# Patient Record
Sex: MALE | Race: WHITE | NOT HISPANIC OR LATINO
[De-identification: names, ages, dates, MRNs, and addresses within clinical notes are randomized per-mention and may not be internally consistent; named-entity substitution may affect disease eponyms.]

---

## 2022-04-26 PROBLEM — Z00.00 ENCOUNTER FOR PREVENTIVE HEALTH EXAMINATION: Status: ACTIVE | Noted: 2022-04-26

## 2022-04-27 ENCOUNTER — APPOINTMENT (OUTPATIENT)
Dept: ORTHOPEDIC SURGERY | Facility: CLINIC | Age: 72
End: 2022-04-27
Payer: MEDICARE

## 2022-04-27 VITALS — WEIGHT: 174 LBS | BODY MASS INDEX: 24.91 KG/M2 | HEIGHT: 70 IN

## 2022-04-27 DIAGNOSIS — E78.00 PURE HYPERCHOLESTEROLEMIA, UNSPECIFIED: ICD-10-CM

## 2022-04-27 PROCEDURE — 73030 X-RAY EXAM OF SHOULDER: CPT | Mod: RT

## 2022-04-27 PROCEDURE — 20611 DRAIN/INJ JOINT/BURSA W/US: CPT | Mod: RT

## 2022-04-27 PROCEDURE — J3490M: CUSTOM

## 2022-04-27 PROCEDURE — 99204 OFFICE O/P NEW MOD 45 MIN: CPT | Mod: 25

## 2022-04-27 RX ORDER — PRAVASTATIN SODIUM 80 MG/1
TABLET ORAL
Refills: 0 | Status: ACTIVE | COMMUNITY

## 2022-04-29 NOTE — HISTORY OF PRESENT ILLNESS
[de-identified] : Here for right shoulder discomfort that has been going on for about 6 months or so now. Had treated about 15 years ago with O/C for the same issue. recently started to have pain with lifting overhead, having some pain with with pushups, bench press or mostly anything over 20lbs. Pain has gotten better recently

## 2022-04-29 NOTE — PHYSICAL EXAM
[NL (0-180)] : full active abduction 0-180 degrees [NL (0-70)] : full internal rotation 0-70 degrees [NL (0-90)] : full external rotation 0-90 degrees [4 ___] : forward flexion 4[unfilled]/5 [4___] : internal rotation 4[unfilled]/5 [] : motor and sensory intact distally [Right] : right shoulder [Degenerative change] : Degenerative change

## 2022-04-29 NOTE — DISCUSSION/SUMMARY
[Surgical risks reviewed] : Surgical risks reviewed [Medication Risks Reviewed] : Medication risks reviewed [de-identified] : signs and symptoms of rotator cuff tear, discussed risks of potential rotator cuff  surgery and risks of operative  and non operative treatment of of tendonitis and impingement, will obtain mri to see if surgery is necessary and guide future treatment, in interim discussed use of nsaids and side effects and recommended rehab and discussed risks and benefits of injection\par will inject and obtain mri to rule out tear\par recommend mri to rule out surgical pathology and further guide treatment, follow up immediately after mri\par The risks and benefits of surgery have been discussed. Risks include but are not limited to bleeding, infection, reaction to anesthesia, injury to blood vessels and nerves, malunion, nonunion, DVT, PE, necessity of repeat surgery, chronic pain, loss of limb and death. The patient understands the risks and has chosen to proceed with conservative care. All questions have been answered.\par Large joint injection was performed of the right shoulder. An injection of Bupivacaine (Marcaine) cc of 0.5%  was used Betamethasone (Celestone) cc of 6mg. \par Patient was advised to call if redness, pain or fever occur and apply ice for 15 minutes out of every hour for the next 12-24 hours as tolerated. \par \par Patient has tried OTC's including aspirin, Ibuprofen, Aleve, etc or prescription NSAIDS, and/or exercises at home and/or physical therapy without satisfactory response and the risks benefits, and alternatives have been discussed, and verbal consent was obtained. \par Ultrasound guidance was indicated for this patient due to precise injection in area of tear. All ultrasound images have been permanently captured and stored accordingly in our picture archiving and communication system. Visualization of the needle and placement of injection was performed without complication. \par The findings showed no evidence of ligament, tendon or muscle tear and no effusion or other fluid collection.\par

## 2022-05-18 ENCOUNTER — APPOINTMENT (OUTPATIENT)
Dept: ORTHOPEDIC SURGERY | Facility: CLINIC | Age: 72
End: 2022-05-18
Payer: MEDICARE

## 2022-05-18 VITALS — HEIGHT: 70 IN | WEIGHT: 174 LBS | BODY MASS INDEX: 24.91 KG/M2

## 2022-05-18 PROCEDURE — 99214 OFFICE O/P EST MOD 30 MIN: CPT

## 2022-05-23 NOTE — PHYSICAL EXAM
[Right] : right shoulder [NL (0-180)] : full active abduction 0-180 degrees [NL (0-70)] : full internal rotation 0-70 degrees [NL (0-90)] : full external rotation 0-90 degrees [] : negative Kunal [FreeTextEntry8] : upper trap spasm

## 2022-05-23 NOTE — DISCUSSION/SUMMARY
[de-identified] : injeciton helped a lot receommend therapy for motion and stengteing and will re eval in amonth\par Discussed risks of potential surgery. However, due to the risks of the surgery, we will try NSAIDs and therapy. Discussed management of medication.\par The risks and benefits of surgery have been discussed. Risks include but are not limited to bleeding, infection, reaction to anesthesia, injury to blood vessels and nerves, malunion, nonunion, DVT, PE, necessity of repeat surgery, chronic pain, loss of limb and death. The patient understands the risks and has chosen to proceed with conservative care. All questions have been answered.\par prescribed nsaids and discussed risks of side effects and timing and management of medication.  side effects can include gi ulcers and irritation as welll as kidney failure and bleeding issues\par follow up 4 weeks\par

## 2022-05-23 NOTE — HISTORY OF PRESENT ILLNESS
[de-identified] : pt is here for a follow up on the r shoulder. cortsione inj from last visit helped a lot, eventually pain slightly returned. pt says shoulder is 70% better. heating pad at night helps

## 2022-07-06 ENCOUNTER — APPOINTMENT (OUTPATIENT)
Dept: ORTHOPEDIC SURGERY | Facility: CLINIC | Age: 72
End: 2022-07-06

## 2022-07-13 ENCOUNTER — APPOINTMENT (OUTPATIENT)
Dept: ORTHOPEDIC SURGERY | Facility: CLINIC | Age: 72
End: 2022-07-13

## 2022-07-13 VITALS — HEIGHT: 70 IN | BODY MASS INDEX: 24.91 KG/M2 | WEIGHT: 174 LBS

## 2022-07-13 DIAGNOSIS — M75.41 IMPINGEMENT SYNDROME OF RIGHT SHOULDER: ICD-10-CM

## 2022-07-13 DIAGNOSIS — S46.011A STRAIN OF MUSCLE(S) AND TENDON(S) OF THE ROTATOR CUFF OF RIGHT SHOULDER, INITIAL ENCOUNTER: ICD-10-CM

## 2022-07-13 PROCEDURE — 99213 OFFICE O/P EST LOW 20 MIN: CPT

## 2022-07-17 PROBLEM — S46.011A TRAUMATIC INCOMPLETE TEAR OF RIGHT ROTATOR CUFF, INITIAL ENCOUNTER: Status: ACTIVE | Noted: 2022-04-29

## 2022-07-17 PROBLEM — M75.41 IMPINGEMENT SYNDROME OF RIGHT SHOULDER: Status: ACTIVE | Noted: 2022-04-27

## 2022-07-17 NOTE — PHYSICAL EXAM
[Right] : right shoulder [NL (0-180)] : full active abduction 0-180 degrees [NL (0-70)] : full internal rotation 0-70 degrees [NL (0-90)] : full external rotation 0-90 degrees [] : motor and sensory intact distally [FreeTextEntry8] : upper trap spasm

## 2022-07-17 NOTE — DISCUSSION/SUMMARY
[Medication Risks Reviewed] : Medication risks reviewed [de-identified] : Discussed risks of potential surgery. However, due to the risks of the surgery, we will try NSAIDs and therapy. Discussed management of medication.\par prescribed nsaids and discussed risks of side effects and timing and management of medication.  side effects can include gi ulcers and irritation as welll as kidney failure and bleeding issues\par  [Surgical risks reviewed] : Surgical risks reviewed

## 2022-07-17 NOTE — HISTORY OF PRESENT ILLNESS
It only means a short acting bronchodilator like albuterol.    Thank you.  
[de-identified] : Patient is here for a follow up of the right shoulder. Pt notes improvement in the right shoulder since last visit, about 85%-90% better. Pt has pain in right shoulder with certain movements. Pt went to PT for 7 weeks, felt as if it helped with right shoulder, pt ended PT 3 weeks ago.
No

## 2022-12-08 ENCOUNTER — APPOINTMENT (OUTPATIENT)
Dept: ORTHOPEDIC SURGERY | Facility: CLINIC | Age: 72
End: 2022-12-08
Payer: MEDICARE

## 2022-12-08 VITALS — BODY MASS INDEX: 24.91 KG/M2 | WEIGHT: 174 LBS | HEIGHT: 70 IN

## 2022-12-08 DIAGNOSIS — L84 CORNS AND CALLOSITIES: ICD-10-CM

## 2022-12-08 PROCEDURE — 11055 PARING/CUTG B9 HYPRKER LES 1: CPT

## 2022-12-08 PROCEDURE — 99203 OFFICE O/P NEW LOW 30 MIN: CPT | Mod: 25

## 2022-12-08 PROCEDURE — 73630 X-RAY EXAM OF FOOT: CPT | Mod: LT

## 2022-12-08 PROCEDURE — 99213 OFFICE O/P EST LOW 20 MIN: CPT | Mod: 25

## 2022-12-08 NOTE — HISTORY OF PRESENT ILLNESS
[Gradual] : gradual [4] : 4 [0] : 0 [Localized] : localized [Throbbing] : throbbing [Intermittent] : intermittent [Household chores] : household chores [Rest] : rest [Exercising] : exercising [de-identified] : 12/08/22 pt presents here today with left foot pain about 3 months ago after exercising \par no specific injury \par no treatment so far  [] : no [FreeTextEntry1] : left foot  [FreeTextEntry5] : no injury  [FreeTextEntry9] : aleve [de-identified] : putting weight on the feet [de-identified] : nothing

## 2023-12-07 ENCOUNTER — APPOINTMENT (OUTPATIENT)
Dept: ORTHOPEDIC SURGERY | Facility: CLINIC | Age: 73
End: 2023-12-07
Payer: MEDICARE

## 2023-12-07 ENCOUNTER — TRANSCRIPTION ENCOUNTER (OUTPATIENT)
Age: 73
End: 2023-12-07

## 2023-12-07 DIAGNOSIS — I48.91 UNSPECIFIED ATRIAL FIBRILLATION: ICD-10-CM

## 2023-12-07 PROCEDURE — 72100 X-RAY EXAM L-S SPINE 2/3 VWS: CPT

## 2023-12-07 PROCEDURE — 99213 OFFICE O/P EST LOW 20 MIN: CPT

## 2023-12-07 RX ORDER — APIXABAN 5 MG/1
5 TABLET, FILM COATED ORAL
Refills: 0 | Status: ACTIVE | COMMUNITY

## 2024-01-18 ENCOUNTER — APPOINTMENT (OUTPATIENT)
Dept: ORTHOPEDIC SURGERY | Facility: CLINIC | Age: 74
End: 2024-01-18
Payer: MEDICARE

## 2024-01-18 PROCEDURE — 99213 OFFICE O/P EST LOW 20 MIN: CPT

## 2024-01-18 NOTE — PHYSICAL EXAM
[Flexion] : flexion [] : sensory exam non-focal throughout both lower extremities [Diminished] : patella reflex diminished [FreeTextEntry8] : R > L [de-identified] : pulses 2+ DP

## 2024-01-18 NOTE — HISTORY OF PRESENT ILLNESS
[2] : 2 [de-identified] : 12/7/23: ARIEL SOSAFREDYRADHA a 72 year old male here for evaluation of lower back. Patient states he has pain in the lower back since 10/2023 without injury. States at times pain goes down the right posterior leg/hamstring pain. Symptoms are intermittent, worse with sitting on the edge of his bed. No prior spine injuries.  01/18/24: follow up on lower back PT 2x week, needs a new PT script. 75% better  [] : no [FreeTextEntry1] : lower back  [FreeTextEntry6] : stiffness

## 2024-02-29 ENCOUNTER — APPOINTMENT (OUTPATIENT)
Dept: ORTHOPEDIC SURGERY | Facility: CLINIC | Age: 74
End: 2024-02-29

## 2024-03-07 ENCOUNTER — APPOINTMENT (OUTPATIENT)
Dept: ORTHOPEDIC SURGERY | Facility: CLINIC | Age: 74
End: 2024-03-07
Payer: MEDICARE

## 2024-03-07 DIAGNOSIS — M54.16 RADICULOPATHY, LUMBAR REGION: ICD-10-CM

## 2024-03-07 PROCEDURE — 99213 OFFICE O/P EST LOW 20 MIN: CPT

## 2024-03-07 NOTE — DISCUSSION/SUMMARY
[de-identified] : Can resume with home exercises.  Discussed activity modifications.  Aleve as needed f/u prn

## 2024-03-07 NOTE — PHYSICAL EXAM
[Diminished] : patella reflex diminished [NL (90)] : forward flexion 90 degrees [Extension] : extension [] : no lumbar paraspinal spasm [de-identified] : pulses 2+ DP

## 2024-03-07 NOTE — HISTORY OF PRESENT ILLNESS
[2] : 2 [de-identified] : 12/7/23: ARIEL HORVATHRADHA a 72 year old male here for evaluation of lower back. Patient states he has pain in the lower back since 10/2023 without injury. States at times pain goes down the right posterior leg/hamstring pain. Symptoms are intermittent, worse with sitting on the edge of his bed. No prior spine injuries.  01/18/24: follow up on lower back PT 2x week, needs a new PT script. 75% better 3/7/24: f/u on the lower back; still with pain. Shoots into the right buttock at times. Seems to have plateaued and is 75% improved. There is stiffness. Aleve helps. PT/HEP   [] : no [FreeTextEntry1] : lower back  [FreeTextEntry6] : stiffness